# Patient Record
Sex: MALE | Race: WHITE | Employment: OTHER | URBAN - NONMETROPOLITAN AREA
[De-identification: names, ages, dates, MRNs, and addresses within clinical notes are randomized per-mention and may not be internally consistent; named-entity substitution may affect disease eponyms.]

---

## 2019-03-12 ENCOUNTER — OFFICE VISIT (OUTPATIENT)
Dept: INTERNAL MEDICINE CLINIC | Age: 74
End: 2019-03-12
Payer: COMMERCIAL

## 2019-03-12 VITALS
BODY MASS INDEX: 31.01 KG/M2 | WEIGHT: 209.4 LBS | DIASTOLIC BLOOD PRESSURE: 50 MMHG | HEART RATE: 62 BPM | HEIGHT: 69 IN | SYSTOLIC BLOOD PRESSURE: 108 MMHG

## 2019-03-12 DIAGNOSIS — E03.8 SUBCLINICAL HYPOTHYROIDISM: ICD-10-CM

## 2019-03-12 DIAGNOSIS — D35.1 PARATHYROID ADENOMA: ICD-10-CM

## 2019-03-12 DIAGNOSIS — E04.1 THYROID NODULE: Primary | ICD-10-CM

## 2019-03-12 PROCEDURE — 99204 OFFICE O/P NEW MOD 45 MIN: CPT | Performed by: INTERNAL MEDICINE

## 2019-03-12 RX ORDER — SULFAMETHOXAZOLE AND TRIMETHOPRIM 800; 160 MG/1; MG/1
1 TABLET ORAL 2 TIMES DAILY
COMMUNITY

## 2019-03-12 ASSESSMENT — PATIENT HEALTH QUESTIONNAIRE - PHQ9
SUM OF ALL RESPONSES TO PHQ QUESTIONS 1-9: 0
SUM OF ALL RESPONSES TO PHQ9 QUESTIONS 1 & 2: 0
1. LITTLE INTEREST OR PLEASURE IN DOING THINGS: 0
2. FEELING DOWN, DEPRESSED OR HOPELESS: 0
SUM OF ALL RESPONSES TO PHQ QUESTIONS 1-9: 0

## 2019-03-12 NOTE — PROGRESS NOTES
[facility] PROFESSIONAL SERVS  PHYSICIANS [address] [place], [address] [place]  Dept: [phone]  Dept Fax: [phone]      Chief Complaint   Patient presents with    Other     abnormal thyroid us-had CT scan today at [facility]-referral from [facility]       Patient presents for evaluation of abnormal thyroid ultrasound. I have never seen the patient before. This patient is followed regularly by Dr. Noreen Quispe. .  Recent thyroid ultrasound showed no solid nodules. There is a questionable parathyroid adenoma  CT of the neck confirms a 1.4 cm nodule right inferior thyroid lobe consistent with parathyroid adenoma. Patient has no history of thyroid cancer. Patient never received radiation to his head or neck  Past Medical History:   Diagnosis Date    Hyperlipidemia        Current Outpatient Medications   Medication Sig Dispense Refill    sulfamethoxazole-trimethoprim (BACTRIM DS;SEPTRA DS) 800-160 MG per tablet Take 1 tablet by mouth 2 times daily      aspirin 325 MG tablet Take 325 mg by mouth daily      Naproxen Sodium (ALEVE) 220 MG CAPS Take by mouth as needed for Pain      metoprolol (LOPRESSOR) 25 MG tablet Take 25 mg by mouth 2 times daily      Multiple Vitamins-Minerals (MULTIVITAMIN PO) Take by mouth      atorvastatin (LIPITOR) 40 MG tablet Take 40 mg by mouth daily       No current facility-administered medications for this visit.         Past Surgical History:   Procedure Laterality Date    CARDIAC CATHETERIZATION  3/2014    CORONARY ARTERY BYPASS GRAFT  8/20/14    UPPER GASTROINTESTINAL ENDOSCOPY         No Known Allergies    Social History     Socioeconomic History    Marital status:      Spouse name: Not on file    Number of children: Not on file    Years of education: Not on file    Highest education level: Not on file   Occupational History    Not on file   Social Needs    Financial resource strain: Not on file    Food insecurity:     Worry: Not on file Inability: Not on file    Transportation needs:     Medical: Not on file     Non-medical: Not on file   Tobacco Use    Smoking status: Never Smoker    Smokeless tobacco: Never Used   Substance and Sexual Activity    Alcohol use: Yes     Alcohol/week: 1.2 oz     Types: 2 Cans of beer per week    Drug use: No    Sexual activity: Not on file   Lifestyle    Physical activity:     Days per week: Not on file     Minutes per session: Not on file    Stress: Not on file   Relationships    Social connections:     Talks on phone: Not on file     Gets together: Not on file     Attends [employer] service: Not on file     Active member of club or organization: Not on file     Attends meetings of clubs or organizations: Not on file     Relationship status: Not on file    Intimate partner violence:     Fear of current or ex partner: Not on file     Emotionally abused: Not on file     Physically abused: Not on file     Forced sexual activity: Not on file   Other Topics Concern    Not on file   Social History Narrative    Not on file   2 children  Retired sold cars    History reviewed. No pertinent family history. Review of Systems - General ROS: negative  Psychological ROS: negative  Hematological and Lymphatic ROS: negative  Respiratory ROS: no cough, shortness of breath, or wheezing  Cardiovascular ROS: no chest pain or dyspnea on exertion  Gastrointestinal ROS: no abdominal pain, change in bowel habits, or black or bloody stools  Genito-Urinary ROS: no dysuria, trouble voiding, or hematuria  Musculoskeletal ROS: negative  Neurological ROS: no TIA or stroke symptoms  Dermatological ROS: negative    Blood pressure (!) 108/50, pulse 62, height 5' 8.5\" (1.74 m), weight 209 lb 6.4 oz (95 kg).     Physical Examination: General appearance - alert, well appearing, and in no distress  HEENT-head normocephalic, atraumatic  Mental status - alert, oriented to person, place, and time  Neck - supple, no significant adenopathy  Chest - clear to auscultation, no wheezes, rales or rhonchi, symmetric air entry  Heart - normal rate, regular rhythm, normal S1, S2, no murmurs, rubs, clicks or gallops  Abdomen - soft, nontender, nondistended, no masses or organomegaly  Neurological - alert, oriented, normal speech, no focal findings or movement disorder noted  Musculoskeletal - no joint tenderness, deformity or swelling  Extremities - peripheral pulses normal, no pedal edema, no clubbing or cyanosis  Skin - normal coloration and turgor, no rashes, no suspicious skin lesions noted        Diagnosis Orders   1. Thyroid nodule  US THYROID   2. Subclinical hypothyroidism     3. Parathyroid adenoma         Orders Placed This Encounter   Procedures    US THYROID     Standing Status:   Future     Standing Expiration Date:   3/12/2020     Scheduling Instructions:      6 months     TSH is 7.09   Clinically is euthyroid without symptoms.   Calcium is normal.  My plan will be to repeat ultrasound of the neck in 6 months   follow-up on TFTs, calcium, etc.   I discussed the situation with patient and answered all questions

## 2019-08-28 ENCOUNTER — TELEPHONE (OUTPATIENT)
Dept: INTERNAL MEDICINE CLINIC | Age: 74
End: 2019-08-28

## 2019-08-28 DIAGNOSIS — D35.1 PARATHYROID ADENOMA: Primary | ICD-10-CM

## 2019-09-18 LAB
IONIZED CA: 1.3 MMOL/L (ref 1.15–1.29)
PARATHYROID HORMONE INTACT: 116.8 U/ML (ref 12–88)

## 2019-09-24 ENCOUNTER — OFFICE VISIT (OUTPATIENT)
Dept: INTERNAL MEDICINE CLINIC | Age: 74
End: 2019-09-24
Payer: COMMERCIAL

## 2019-09-24 VITALS
DIASTOLIC BLOOD PRESSURE: 60 MMHG | HEIGHT: 68 IN | BODY MASS INDEX: 31.67 KG/M2 | RESPIRATION RATE: 16 BRPM | HEART RATE: 82 BPM | WEIGHT: 209 LBS | SYSTOLIC BLOOD PRESSURE: 132 MMHG

## 2019-09-24 DIAGNOSIS — E03.8 SUBCLINICAL HYPOTHYROIDISM: ICD-10-CM

## 2019-09-24 DIAGNOSIS — D35.1 PARATHYROID ADENOMA: Primary | ICD-10-CM

## 2019-09-24 DIAGNOSIS — E04.1 THYROID NODULE: ICD-10-CM

## 2019-09-24 PROCEDURE — 99214 OFFICE O/P EST MOD 30 MIN: CPT | Performed by: INTERNAL MEDICINE

## 2019-09-24 NOTE — PROGRESS NOTES
[facility] PROFESSIONAL SERVS  PHYSICIANS [address] [place] [address] [place], [address] [place]  Dept: [phone]  Dept Fax: [phone]      Chief Complaint   Patient presents with    Annual Exam     Thyroid lab results       Patient presents for evaluation of abnormal thyroid ultrasound. I saw him 6-1/2 months ago. This patient is followed regularly by Dr. Treva Drake . thyroid ultrasound showed no solid nodules. There is a questionable parathyroid adenoma  CT of the neck confirms a 1.4 cm nodule right inferior thyroid lobe consistent with parathyroid adenoma. Patient has no history of thyroid cancer. Patient never received radiation to his head or neck  Past Medical History:   Diagnosis Date    Hyperlipidemia        Current Outpatient Medications   Medication Sig Dispense Refill    sulfamethoxazole-trimethoprim (BACTRIM DS;SEPTRA DS) 800-160 MG per tablet Take 1 tablet by mouth 2 times daily      aspirin 325 MG tablet Take 325 mg by mouth daily      Naproxen Sodium (ALEVE) 220 MG CAPS Take by mouth as needed for Pain      metoprolol (LOPRESSOR) 25 MG tablet Take 25 mg by mouth 2 times daily      Multiple Vitamins-Minerals (MULTIVITAMIN PO) Take by mouth      atorvastatin (LIPITOR) 40 MG tablet Take 40 mg by mouth daily       No current facility-administered medications for this visit. Review of Systems - General ROS: negative  Psychological ROS: negative  Hematological and Lymphatic ROS: negative  Respiratory ROS: no cough, shortness of breath, or wheezing  Cardiovascular ROS: no chest pain or dyspnea on exertion  Gastrointestinal ROS: no abdominal pain, change in bowel habits, or black or bloody stools  Genito-Urinary ROS: no dysuria, trouble voiding, or hematuria  Musculoskeletal ROS: negative  Neurological ROS: no TIA or stroke symptoms  Dermatological ROS: negative    Blood pressure 132/60, pulse 82, resp. rate 16, height 5' 8\" (1.727 m), weight 209 lb (94.8 kg).     Physical

## 2019-09-26 LAB
CALCIUM 24 HOUR URINE: 388 MG/24 HR (ref 100–300)
CALCIUM URINE: 27.7 MG/DL
Lab: 1440 MINUTES
URINE TOTAL VOLUME: 1400 ML

## 2019-10-15 ENCOUNTER — OFFICE VISIT (OUTPATIENT)
Dept: INTERNAL MEDICINE CLINIC | Age: 74
End: 2019-10-15
Payer: COMMERCIAL

## 2019-10-15 VITALS
SYSTOLIC BLOOD PRESSURE: 122 MMHG | HEIGHT: 68 IN | WEIGHT: 210 LBS | HEART RATE: 86 BPM | DIASTOLIC BLOOD PRESSURE: 74 MMHG | BODY MASS INDEX: 31.83 KG/M2

## 2019-10-15 DIAGNOSIS — E34.9 ELEVATED PARATHYROID HORMONE: ICD-10-CM

## 2019-10-15 DIAGNOSIS — E03.8 SUBCLINICAL HYPOTHYROIDISM: ICD-10-CM

## 2019-10-15 DIAGNOSIS — E04.1 THYROID NODULE: Primary | ICD-10-CM

## 2019-10-15 DIAGNOSIS — D35.1 PARATHYROID ADENOMA: ICD-10-CM

## 2019-10-15 PROCEDURE — 99213 OFFICE O/P EST LOW 20 MIN: CPT | Performed by: NURSE PRACTITIONER

## 2019-10-16 ASSESSMENT — ENCOUNTER SYMPTOMS
NAUSEA: 0
CHOKING: 0
COUGH: 0
SORE THROAT: 0
DIARRHEA: 0
SHORTNESS OF BREATH: 0
TROUBLE SWALLOWING: 0
EYE ITCHING: 0
ABDOMINAL PAIN: 0
VOMITING: 0
CONSTIPATION: 0

## 2019-10-17 LAB
ANION GAP SERPL CALCULATED.3IONS-SCNC: 4 MMOL/L (ref 4–12)
BUN BLDV-MCNC: 15 MG/DL (ref 7–20)
CALCIUM SERPL-MCNC: 9.9 MG/DL (ref 8.8–10.5)
CHLORIDE BLD-SCNC: 109 MEQ/L (ref 101–111)
CO2: 27 MEQ/L (ref 21–32)
CREAT SERPL-MCNC: 0.8 MG/DL (ref 0.6–1.3)
CREATININE CLEARANCE: >60
GLUCOSE: 100 MG/DL (ref 70–110)
IONIZED CA: 1.35 MMOL/L (ref 1.15–1.29)
PARATHYROID HORMONE INTACT: 99.6 U/ML (ref 12–88)
POTASSIUM SERPL-SCNC: 4.2 MEQ/L (ref 3.6–5)
SODIUM BLD-SCNC: 140 MEQ/L (ref 135–145)
TSH SERPL DL<=0.05 MIU/L-ACNC: 4.64 MCIU/ML (ref 0.49–4.67)
VITAMIN D 25-HYDROXY: 19.5 NG/ML (ref 30–100)

## 2019-10-18 ENCOUNTER — TELEPHONE (OUTPATIENT)
Dept: INTERNAL MEDICINE CLINIC | Age: 74
End: 2019-10-18

## 2019-10-22 LAB — MISCELLANEOUS LAB TEST ORDER: NORMAL

## 2019-10-23 ENCOUNTER — OFFICE VISIT (OUTPATIENT)
Dept: INTERNAL MEDICINE CLINIC | Age: 74
End: 2019-10-23
Payer: COMMERCIAL

## 2019-10-23 VITALS
HEART RATE: 85 BPM | RESPIRATION RATE: 17 BRPM | DIASTOLIC BLOOD PRESSURE: 60 MMHG | WEIGHT: 211.25 LBS | SYSTOLIC BLOOD PRESSURE: 127 MMHG | HEIGHT: 68 IN | BODY MASS INDEX: 32.02 KG/M2

## 2019-10-23 DIAGNOSIS — D35.1 PARATHYROID ADENOMA: ICD-10-CM

## 2019-10-23 DIAGNOSIS — E04.1 THYROID NODULE: Primary | ICD-10-CM

## 2019-10-23 PROCEDURE — 99213 OFFICE O/P EST LOW 20 MIN: CPT | Performed by: INTERNAL MEDICINE

## 2024-12-11 ENCOUNTER — OFFICE VISIT (OUTPATIENT)
Age: 79
End: 2024-12-11
Payer: MEDICARE

## 2024-12-11 VITALS
RESPIRATION RATE: 16 BRPM | WEIGHT: 200.8 LBS | HEART RATE: 74 BPM | BODY MASS INDEX: 30.43 KG/M2 | SYSTOLIC BLOOD PRESSURE: 110 MMHG | HEIGHT: 68 IN | DIASTOLIC BLOOD PRESSURE: 58 MMHG

## 2024-12-11 DIAGNOSIS — E04.2 MULTINODULAR GOITER: ICD-10-CM

## 2024-12-11 DIAGNOSIS — E03.9 ACQUIRED HYPOTHYROIDISM: Primary | ICD-10-CM

## 2024-12-11 PROCEDURE — 1123F ACP DISCUSS/DSCN MKR DOCD: CPT | Performed by: INTERNAL MEDICINE

## 2024-12-11 PROCEDURE — 1159F MED LIST DOCD IN RCRD: CPT | Performed by: INTERNAL MEDICINE

## 2024-12-11 PROCEDURE — 99204 OFFICE O/P NEW MOD 45 MIN: CPT | Performed by: INTERNAL MEDICINE

## 2024-12-11 PROCEDURE — 1160F RVW MEDS BY RX/DR IN RCRD: CPT | Performed by: INTERNAL MEDICINE

## 2024-12-11 RX ORDER — LORATADINE 10 MG/1
10 TABLET ORAL DAILY
COMMUNITY

## 2024-12-11 RX ORDER — LEVOTHYROXINE SODIUM 25 UG/1
TABLET ORAL
COMMUNITY
Start: 2024-09-27

## 2024-12-11 RX ORDER — FLUTICASONE PROPIONATE 50 MCG
1 SPRAY, SUSPENSION (ML) NASAL DAILY PRN
COMMUNITY

## 2024-12-11 RX ORDER — CHOLECALCIFEROL (VITAMIN D3) 125 MCG
5000 CAPSULE ORAL DAILY
COMMUNITY

## 2024-12-11 NOTE — PROGRESS NOTES
needed.         Electronically signed by Vitor Lima MD 12/11/2024 11:43 AM     **This report has been created using voice recognition software. It may   contain minor errors which are inherent in voice recognition technology.**

## 2025-02-05 ENCOUNTER — TELEPHONE (OUTPATIENT)
Age: 80
End: 2025-02-05

## 2025-02-05 NOTE — TELEPHONE ENCOUNTER
Patient wife called in again stating that they state that they picked up another 90 day supply of levothyroxine from a different pharmacy . Please advise

## 2025-02-05 NOTE — TELEPHONE ENCOUNTER
Nurses Note -- 4 Eyes      8/12/2024   7:23 PM      Skin assessed during: Q Shift Change      [x] No Altered Skin Integrity Present    [x]Prevention Measures Documented      [] Yes- Altered Skin Integrity Present or Discovered   [] LDA Added if Not in Epic (Describe Wound)   [] New Altered Skin Integrity was Present on Admit and Documented in LDA   [] Wound Image Taken    Wound Care Consulted? No    Attending Nurse:  Roberto oRdriguez RN/Staff Member:  Denita           Patient wife called in stating that patient only has two more days of levothyroxine . She states that [facility] on [address] and is out and does not know when they will get any back in. She also states that she got a letter from insurance stating that there was a recall on the levothyroxine.

## 2025-02-06 NOTE — TELEPHONE ENCOUNTER
Pt wife said the bottle she picked up from [facility] yesterday is on the recall sheet, she states that he took one this morning.   [employer] phone # [phone]

## 2025-02-14 ENCOUNTER — CLINICAL DOCUMENTATION (OUTPATIENT)
Age: 80
End: 2025-02-14

## 2025-02-14 NOTE — PROGRESS NOTES
I spoke with pharmacy.  They recommended for patient to take back the medication that was supplied, along with a recall letter so they can replace it with evaluate bottle of medication.  This message was communicated to the patient's wife, and I instructed her to go to the lab right away for appropriate action.

## 2025-02-21 ENCOUNTER — CLINICAL DOCUMENTATION (OUTPATIENT)
Age: 80
End: 2025-02-21

## 2025-02-21 DIAGNOSIS — E03.9 ACQUIRED HYPOTHYROIDISM: Primary | ICD-10-CM

## 2025-02-25 NOTE — TELEPHONE ENCOUNTER
Spoke with patient wife, she was informed and verbalized understanding. No further questions or concerns at this time

## 2025-03-04 LAB
T4 FREE: 0.79 NG/DL (ref 0.61–1.12)
TSH SERPL DL<=0.05 MIU/L-ACNC: 4.82 MCIU/ML (ref 0.49–4.67)

## 2025-03-16 ENCOUNTER — RESULTS FOLLOW-UP (OUTPATIENT)
Age: 80
End: 2025-03-16

## 2025-03-16 NOTE — RESULT ENCOUNTER NOTE
Marlon Murray  lab test(s) were abnormal.  Keep appointment to discuss thyroid labs.  Please contact patient and document response.

## 2025-03-17 NOTE — TELEPHONE ENCOUNTER
----- Message from Dr. Vitor Lima MD sent at 3/16/2025 12:14 PM EDT -----  Marlon Murray  lab test(s) were abnormal.  Keep appointment to discuss thyroid labs.  Please contact patient and document response.

## 2025-03-17 NOTE — TELEPHONE ENCOUNTER
Pt informed and verbalized understanding with no further questions at this time.    ----- Message from Dr. Vitor Lima MD sent at 3/16/2025 12:14 PM EDT -----  Marlon Murray  lab test(s) were abnormal.  Keep appointment to discuss thyroid labs.  Please contact patient and document response.

## 2025-03-18 ENCOUNTER — OFFICE VISIT (OUTPATIENT)
Age: 80
End: 2025-03-18

## 2025-03-18 VITALS
WEIGHT: 205 LBS | BODY MASS INDEX: 31.07 KG/M2 | HEART RATE: 77 BPM | SYSTOLIC BLOOD PRESSURE: 102 MMHG | HEIGHT: 68 IN | DIASTOLIC BLOOD PRESSURE: 56 MMHG

## 2025-03-18 DIAGNOSIS — E04.2 MULTINODULAR GOITER: ICD-10-CM

## 2025-03-18 DIAGNOSIS — E03.9 ACQUIRED HYPOTHYROIDISM: Primary | ICD-10-CM

## 2025-03-18 NOTE — PROGRESS NOTES
[facility] PHYSICIANS [place] [facility]  [facility] ENDOCRINOLOGY  [address]  [facility] [address]  Dept: [phone]  Loc: [phone]     Visit Date: 3/18/2025    Marlon Murray is a 79 y.o. male who presents today for:  Chief Complaint   Patient presents with    Follow-up     Acquired hypothyroidism      The patient (or guardian, if applicable) and other individuals in attendance with the patient were advised that Artificial Intelligence will be utilized during this visit to record, process the conversation to generate a clinical note, and support improvement of the AI technology. The patient (or guardian, if applicable) and other individuals in attendance at the appointment consented to the use of AI, including the recording.           Subjective:      HPI   History of Present Illness  The patient presents for evaluation of hypothyroidism and thyroid nodule.    He was last seen in 09/2024 for hypothyroidism and has been adhering to the prescribed regimen of levothyroxine 25 mcg, taking 1.5 tablets 4 times weekly and 1 tablet 3 times weekly. He reports no significant changes in his condition since the last visit. He has not experienced weight gain, excessive cold sensitivity, constipation, or depression. He recalls a previous episode of irritability, which was attributed to a medication error and resolved upon correction.    He has a history of a thyroid nodule, which has been evaluated via ultrasound and biopsy. He reports no neck discomfort, pain, choking sensation, or voice changes. He expresses curiosity about the possibility of the nodule resolving over time.            Past Medical History:   Diagnosis Date    Hyperlipidemia       Past Surgical History:   Procedure Laterality Date    CARDIAC CATHETERIZATION  3/2014    CORONARY ARTERY BYPASS GRAFT  8/20/14    UPPER GASTROINTESTINAL ENDOSCOPY         History reviewed. No pertinent family history.  Social History     Tobacco Use

## 2025-06-26 ENCOUNTER — OFFICE VISIT (OUTPATIENT)
Age: 80
End: 2025-06-26
Payer: MEDICARE

## 2025-06-26 VITALS
HEIGHT: 68 IN | DIASTOLIC BLOOD PRESSURE: 50 MMHG | HEART RATE: 67 BPM | SYSTOLIC BLOOD PRESSURE: 122 MMHG | BODY MASS INDEX: 30.71 KG/M2 | WEIGHT: 202.6 LBS

## 2025-06-26 DIAGNOSIS — E04.2 MULTINODULAR GOITER: ICD-10-CM

## 2025-06-26 DIAGNOSIS — E03.9 ACQUIRED HYPOTHYROIDISM: Primary | ICD-10-CM

## 2025-06-26 PROCEDURE — 1159F MED LIST DOCD IN RCRD: CPT | Performed by: INTERNAL MEDICINE

## 2025-06-26 PROCEDURE — 99214 OFFICE O/P EST MOD 30 MIN: CPT | Performed by: INTERNAL MEDICINE

## 2025-06-26 PROCEDURE — 1123F ACP DISCUSS/DSCN MKR DOCD: CPT | Performed by: INTERNAL MEDICINE

## 2025-06-26 RX ORDER — LEVOTHYROXINE SODIUM 25 UG/1
TABLET ORAL
Qty: 45 TABLET | Refills: 5 | Status: SHIPPED | OUTPATIENT
Start: 2025-06-26

## 2025-06-26 NOTE — PROGRESS NOTES
[facility]YSICIANS [place] [facility]  [facility] ENDOCRINOLOGY  [address]  [address]  [place] [facility] [address]  Dept: [phone]  Loc: [phone]     Visit Date: 6/26/2025    Marlon Murray is a 80 y.o. male who presents today for:  Chief Complaint   Patient presents with    Follow-up     Acquired hypothyroidism             Subjective:      HPI   History of Present Illness               Marlon Murray is a 80 y.o. , male who comes for Follow up for hypothyroidism.  Martha Pedro, JASON - WANDER  Marlon Murray reports being diagnosed with hypothyroidism 4-5 years ago.   Etiology of hypothyroidism is Unknown.   Current therapy includes synthroid [DIAZ] 37.5 mcg 5 days per week and 25 mcg 2 days per week(Tuesday and Saturday).  Current symptoms include None.  His last Thyroid studies showed subclinical hypothyroidism with A TSH of 4.8 and Free T4 0.79. At that time his synthroid dosage was increased from a pill and a half 4x weekly with a single pill on the remaining 3 days to a pill and a half 5x weekly with a single pill on the remain 2 days.  in the past he has had mood changes associated with over treatment. He did not get thyroid labs after the medication adjustment and is overdue for thyroid studies. He continues to be asymptomatic and denies heat/cold intolerance, weight change, mood disturbances, hair loss, leg swelling and rash, tremor, and heart palpitations.          Past Medical History:   Diagnosis Date    Hyperlipidemia       Past Surgical History:   Procedure Laterality Date    CARDIAC CATHETERIZATION  3/2014    CORONARY ARTERY BYPASS GRAFT  8/20/14    UPPER GASTROINTESTINAL ENDOSCOPY         No family history on file.  Social History     Tobacco Use    Smoking status: Never    Smokeless tobacco: Never   Substance Use Topics    Alcohol use: Yes     Alcohol/week: 2.0 standard drinks of alcohol     Types: 2 Cans of beer per week      Current Outpatient Medications   Medication Sig Dispense